# Patient Record
Sex: MALE | Race: BLACK OR AFRICAN AMERICAN | NOT HISPANIC OR LATINO | Employment: PART TIME | URBAN - METROPOLITAN AREA
[De-identification: names, ages, dates, MRNs, and addresses within clinical notes are randomized per-mention and may not be internally consistent; named-entity substitution may affect disease eponyms.]

---

## 2022-10-29 ENCOUNTER — HOSPITAL ENCOUNTER (EMERGENCY)
Facility: HOSPITAL | Age: 27
Discharge: HOME | End: 2022-10-29
Attending: EMERGENCY MEDICINE
Payer: MEDICAID

## 2022-10-29 VITALS
WEIGHT: 315 LBS | HEART RATE: 76 BPM | RESPIRATION RATE: 21 BRPM | DIASTOLIC BLOOD PRESSURE: 58 MMHG | SYSTOLIC BLOOD PRESSURE: 124 MMHG | TEMPERATURE: 97 F | OXYGEN SATURATION: 98 %

## 2022-10-29 DIAGNOSIS — R00.2 PALPITATIONS: ICD-10-CM

## 2022-10-29 DIAGNOSIS — R03.0 ELEVATED BLOOD PRESSURE READING: Primary | ICD-10-CM

## 2022-10-29 DIAGNOSIS — F41.9 ANXIOUS MOOD: ICD-10-CM

## 2022-10-29 LAB
ALBUMIN SERPL BCP-MCNC: 4.2 G/DL (ref 3.5–5.2)
ALP SERPL-CCNC: 88 U/L (ref 55–135)
ALT SERPL W/O P-5'-P-CCNC: 24 U/L (ref 10–44)
AMPHET+METHAMPHET UR QL: NEGATIVE
ANION GAP SERPL CALC-SCNC: 14 MMOL/L (ref 8–16)
AST SERPL-CCNC: 18 U/L (ref 10–40)
BARBITURATES UR QL SCN>200 NG/ML: NEGATIVE
BASOPHILS # BLD AUTO: 0.04 K/UL (ref 0–0.2)
BASOPHILS NFR BLD: 0.4 % (ref 0–1.9)
BENZODIAZ UR QL SCN>200 NG/ML: NEGATIVE
BILIRUB SERPL-MCNC: 0.4 MG/DL (ref 0.1–1)
BILIRUB UR QL STRIP: NEGATIVE
BNP SERPL-MCNC: <10 PG/ML (ref 0–99)
BUN SERPL-MCNC: 7 MG/DL (ref 6–20)
BZE UR QL SCN: NEGATIVE
CALCIUM SERPL-MCNC: 9.7 MG/DL (ref 8.7–10.5)
CANNABINOIDS UR QL SCN: NEGATIVE
CHLORIDE SERPL-SCNC: 103 MMOL/L (ref 95–110)
CLARITY UR: CLEAR
CO2 SERPL-SCNC: 21 MMOL/L (ref 23–29)
COLOR UR: YELLOW
CREAT SERPL-MCNC: 0.8 MG/DL (ref 0.5–1.4)
CREAT UR-MCNC: 110.3 MG/DL (ref 23–375)
D DIMER PPP IA.FEU-MCNC: 0.84 MG/L FEU
DIFFERENTIAL METHOD: ABNORMAL
EOSINOPHIL # BLD AUTO: 0.1 K/UL (ref 0–0.5)
EOSINOPHIL NFR BLD: 1.1 % (ref 0–8)
ERYTHROCYTE [DISTWIDTH] IN BLOOD BY AUTOMATED COUNT: 14.4 % (ref 11.5–14.5)
EST. GFR  (NO RACE VARIABLE): >60 ML/MIN/1.73 M^2
GLUCOSE SERPL-MCNC: 137 MG/DL (ref 70–110)
GLUCOSE UR QL STRIP: NEGATIVE
HCT VFR BLD AUTO: 50.7 % (ref 40–54)
HCV AB SERPL QL IA: NEGATIVE
HEP C VIRUS HOLD SPECIMEN: NORMAL
HGB BLD-MCNC: 17 G/DL (ref 14–18)
HGB UR QL STRIP: NEGATIVE
HIV 1+2 AB+HIV1 P24 AG SERPL QL IA: NEGATIVE
IMM GRANULOCYTES # BLD AUTO: 0.03 K/UL (ref 0–0.04)
IMM GRANULOCYTES NFR BLD AUTO: 0.3 % (ref 0–0.5)
KETONES UR QL STRIP: NEGATIVE
LEUKOCYTE ESTERASE UR QL STRIP: NEGATIVE
LYMPHOCYTES # BLD AUTO: 2.2 K/UL (ref 1–4.8)
LYMPHOCYTES NFR BLD: 21.9 % (ref 18–48)
MCH RBC QN AUTO: 26.5 PG (ref 27–31)
MCHC RBC AUTO-ENTMCNC: 33.5 G/DL (ref 32–36)
MCV RBC AUTO: 79 FL (ref 82–98)
METHADONE UR QL SCN>300 NG/ML: NEGATIVE
MONOCYTES # BLD AUTO: 0.5 K/UL (ref 0.3–1)
MONOCYTES NFR BLD: 5.2 % (ref 4–15)
NEUTROPHILS # BLD AUTO: 7.3 K/UL (ref 1.8–7.7)
NEUTROPHILS NFR BLD: 71.1 % (ref 38–73)
NITRITE UR QL STRIP: NEGATIVE
NRBC BLD-RTO: 0 /100 WBC
OPIATES UR QL SCN: NEGATIVE
PCP UR QL SCN>25 NG/ML: NEGATIVE
PH UR STRIP: 7 [PH] (ref 5–8)
PLATELET # BLD AUTO: 356 K/UL (ref 150–450)
PMV BLD AUTO: 10.2 FL (ref 9.2–12.9)
POTASSIUM SERPL-SCNC: 3.8 MMOL/L (ref 3.5–5.1)
PROT SERPL-MCNC: 8.2 G/DL (ref 6–8.4)
PROT UR QL STRIP: NEGATIVE
RBC # BLD AUTO: 6.42 M/UL (ref 4.6–6.2)
SODIUM SERPL-SCNC: 138 MMOL/L (ref 136–145)
SP GR UR STRIP: 1.01 (ref 1–1.03)
T4 FREE SERPL-MCNC: 0.99 NG/DL (ref 0.71–1.51)
TOXICOLOGY INFORMATION: NORMAL
TROPONIN I SERPL DL<=0.01 NG/ML-MCNC: <0.006 NG/ML (ref 0–0.03)
TSH SERPL DL<=0.005 MIU/L-ACNC: 1.3 UIU/ML (ref 0.4–4)
URN SPEC COLLECT METH UR: NORMAL
UROBILINOGEN UR STRIP-ACNC: NEGATIVE EU/DL
WBC # BLD AUTO: 10.22 K/UL (ref 3.9–12.7)

## 2022-10-29 PROCEDURE — 93005 ELECTROCARDIOGRAM TRACING: CPT

## 2022-10-29 PROCEDURE — 84443 ASSAY THYROID STIM HORMONE: CPT | Performed by: EMERGENCY MEDICINE

## 2022-10-29 PROCEDURE — 99285 EMERGENCY DEPT VISIT HI MDM: CPT | Mod: 25

## 2022-10-29 PROCEDURE — 84484 ASSAY OF TROPONIN QUANT: CPT | Performed by: NURSE PRACTITIONER

## 2022-10-29 PROCEDURE — 85025 COMPLETE CBC W/AUTO DIFF WBC: CPT | Performed by: NURSE PRACTITIONER

## 2022-10-29 PROCEDURE — 87389 HIV-1 AG W/HIV-1&-2 AB AG IA: CPT | Performed by: EMERGENCY MEDICINE

## 2022-10-29 PROCEDURE — 84439 ASSAY OF FREE THYROXINE: CPT | Performed by: EMERGENCY MEDICINE

## 2022-10-29 PROCEDURE — 93010 ELECTROCARDIOGRAM REPORT: CPT | Mod: ,,, | Performed by: INTERNAL MEDICINE

## 2022-10-29 PROCEDURE — 81003 URINALYSIS AUTO W/O SCOPE: CPT | Mod: 59 | Performed by: EMERGENCY MEDICINE

## 2022-10-29 PROCEDURE — 93010 EKG 12-LEAD: ICD-10-PCS | Mod: ,,, | Performed by: INTERNAL MEDICINE

## 2022-10-29 PROCEDURE — 25500020 PHARM REV CODE 255: Performed by: EMERGENCY MEDICINE

## 2022-10-29 PROCEDURE — 25000003 PHARM REV CODE 250: Performed by: EMERGENCY MEDICINE

## 2022-10-29 PROCEDURE — 83880 ASSAY OF NATRIURETIC PEPTIDE: CPT | Performed by: EMERGENCY MEDICINE

## 2022-10-29 PROCEDURE — 85379 FIBRIN DEGRADATION QUANT: CPT | Performed by: EMERGENCY MEDICINE

## 2022-10-29 PROCEDURE — 86803 HEPATITIS C AB TEST: CPT | Performed by: EMERGENCY MEDICINE

## 2022-10-29 PROCEDURE — 80053 COMPREHEN METABOLIC PANEL: CPT | Performed by: NURSE PRACTITIONER

## 2022-10-29 PROCEDURE — 80307 DRUG TEST PRSMV CHEM ANLYZR: CPT | Performed by: EMERGENCY MEDICINE

## 2022-10-29 RX ORDER — METOPROLOL TARTRATE 50 MG/1
50 TABLET ORAL
Status: COMPLETED | OUTPATIENT
Start: 2022-10-29 | End: 2022-10-29

## 2022-10-29 RX ORDER — HYDROXYZINE PAMOATE 25 MG/1
25 CAPSULE ORAL EVERY 6 HOURS PRN
Qty: 30 CAPSULE | Refills: 0 | Status: SHIPPED | OUTPATIENT
Start: 2022-10-29

## 2022-10-29 RX ADMIN — IOHEXOL 100 ML: 350 INJECTION, SOLUTION INTRAVENOUS at 09:10

## 2022-10-29 RX ADMIN — METOPROLOL TARTRATE 50 MG: 50 TABLET, FILM COATED ORAL at 07:10

## 2022-10-29 NOTE — FIRST PROVIDER EVALUATION
Medical screening examination initiated.  I have conducted a focused provider triage encounter, findings are as follows:    Brief history of present illness:  pt. C/o palpitations and exertional dyspnea    There were no vitals filed for this visit.    Pertinent physical exam:  nad    Brief workup plan:  cardiac    Preliminary workup initiated; this workup will be continued and followed by the physician or advanced practice provider that is assigned to the patient when roomed.

## 2022-10-30 NOTE — ED PROVIDER NOTES
SCRIBE #1 NOTE: INiesha am scribing for, and in the presence of, Polo Powers Jr., MD. I have scribed the HPI, ROS, and PEx.     SCRIBE #2 NOTE: I, Alexus Schroeder am scribing for, and in the presence of,  Sushant Lee MD. I have scribed the remaining portions of the note not scribed by Scribe #1.    History     Chief Complaint   Patient presents with    Tachycardia     Feeling palpitations since last night. HR on apple watch was 120. Has been feeling tired and short of breath after minimal effort      Review of patient's allergies indicates:  No Known Allergies      History of Present Illness     HPI    10/29/2022, 7:13 PM  History obtained from the patient      History of Present Illness: Emre Blanchard is a 27 y.o. male patient with a past medical history of HTN who presents to the Emergency Department for evaluation of tachycardia which onset last night. Pt notes no pain in his calves or hx of blood clots. Symptoms are constant and moderate in severity. No mitigating or exacerbating factors reported. Associated sxs include palpitations, fatigue, and paresthesia in left hand. Patient denies any CP, SOB, diaphoresis, LOC, fever, and all other sxs at this time. No further complaints or concerns at this time.     Arrival mode: Personal vehicle     PCP: No primary care provider on file.        Past Medical History:  History reviewed. No pertinent past medical history.    Past Surgical History:  History reviewed. No pertinent surgical history.      Family History:  History reviewed. No pertinent family history.    Social History:  Social History     Tobacco Use    Smoking status: Not on file    Smokeless tobacco: Not on file   Substance and Sexual Activity    Alcohol use: Not Currently    Drug use: Not Currently    Sexual activity: Yes     Partners: Female        Review of Systems     Review of Systems   Constitutional:  Positive for fatigue. Negative for diaphoresis and fever.   HENT:  Negative for sore  throat.    Respiratory:  Negative for shortness of breath.    Cardiovascular:  Positive for palpitations. Negative for chest pain.   Gastrointestinal:  Negative for nausea.   Genitourinary:  Negative for dysuria.   Musculoskeletal:  Negative for back pain.   Skin:  Negative for rash.   Neurological:  Negative for syncope and weakness.        (+) paresthesia left hand   Hematological:  Does not bruise/bleed easily.   All other systems reviewed and are negative.     Physical Exam     Initial Vitals   BP Pulse Resp Temp SpO2   10/29/22 1809 10/29/22 1809 10/29/22 1809 10/29/22 1810 10/29/22 1809   (!) 193/86 (!) 116 18 98.3 °F (36.8 °C) 98 %      MAP       --                 Physical Exam  Nursing Notes and Vital Signs Reviewed.  Constitutional: Patient is in no acute distress. Well-developed and well-nourished.  Head: Atraumatic. Normocephalic.  Eyes: PERRL. EOM intact. Conjunctivae are not pale. No scleral icterus.  ENT: Mucous membranes are moist. Oropharynx is clear and symmetric.    Neck: Supple. Full ROM. No lymphadenopathy.  Cardiovascular: Tachycardia. Regular rhythm. No murmurs, rubs, or gallops. Distal pulses are 2+ and symmetric.  Pulmonary/Chest: No respiratory distress. Clear to auscultation bilaterally. No wheezing or rales.  Abdominal: Soft and non-distended.  There is no tenderness.  No rebound, guarding, or rigidity. Good bowel sounds.  Genitourinary: No CVA tenderness  Musculoskeletal: Moves all extremities. No obvious deformities. No edema. No calf tenderness.  Skin: Warm and dry.  Neurological:  Alert, awake, and appropriate.  Normal speech.  No acute focal neurological deficits are appreciated.  Psychiatric: Normal affect. Good eye contact. Appropriate in content.     ED Course   Procedures  ED Vital Signs:  Vitals:    10/29/22 1809 10/29/22 1810 10/29/22 1900 10/29/22 1903   BP: (!) 193/86  (!) 143/86 (!) 143/86   Pulse: (!) 116  104 103   Resp: 18   13   Temp:  98.3 °F (36.8 °C)     TempSrc: Oral  Oral     SpO2: 98%   98%   Weight: (!) 155.6 kg (343 lb 0.6 oz)          Abnormal Lab Results:  Labs Reviewed   CBC W/ AUTO DIFFERENTIAL - Abnormal; Notable for the following components:       Result Value    RBC 6.42 (*)     MCV 79 (*)     MCH 26.5 (*)     All other components within normal limits   COMPREHENSIVE METABOLIC PANEL - Abnormal; Notable for the following components:    CO2 21 (*)     Glucose 137 (*)     All other components within normal limits   D DIMER, QUANTITATIVE - Abnormal; Notable for the following components:    D-Dimer 0.84 (*)     All other components within normal limits   TROPONIN I   TSH   T4, FREE   URINALYSIS, REFLEX TO URINE CULTURE    Narrative:     Specimen Source->Urine   DRUG SCREEN PANEL, URINE EMERGENCY    Narrative:     Specimen Source->Urine   HIV 1 / 2 ANTIBODY    Narrative:     Release to patient->Immediate   HEPATITIS C ANTIBODY    Narrative:     Release to patient->Immediate   HEP C VIRUS HOLD SPECIMEN    Narrative:     Release to patient->Immediate   B-TYPE NATRIURETIC PEPTIDE        All Lab Results:  Results for orders placed or performed during the hospital encounter of 10/29/22   CBC auto differential   Result Value Ref Range    WBC 10.22 3.90 - 12.70 K/uL    RBC 6.42 (H) 4.60 - 6.20 M/uL    Hemoglobin 17.0 14.0 - 18.0 g/dL    Hematocrit 50.7 40.0 - 54.0 %    MCV 79 (L) 82 - 98 fL    MCH 26.5 (L) 27.0 - 31.0 pg    MCHC 33.5 32.0 - 36.0 g/dL    RDW 14.4 11.5 - 14.5 %    Platelets 356 150 - 450 K/uL    MPV 10.2 9.2 - 12.9 fL    Immature Granulocytes 0.3 0.0 - 0.5 %    Gran # (ANC) 7.3 1.8 - 7.7 K/uL    Immature Grans (Abs) 0.03 0.00 - 0.04 K/uL    Lymph # 2.2 1.0 - 4.8 K/uL    Mono # 0.5 0.3 - 1.0 K/uL    Eos # 0.1 0.0 - 0.5 K/uL    Baso # 0.04 0.00 - 0.20 K/uL    nRBC 0 0 /100 WBC    Gran % 71.1 38.0 - 73.0 %    Lymph % 21.9 18.0 - 48.0 %    Mono % 5.2 4.0 - 15.0 %    Eosinophil % 1.1 0.0 - 8.0 %    Basophil % 0.4 0.0 - 1.9 %    Differential Method Automated     Comprehensive metabolic panel   Result Value Ref Range    Sodium 138 136 - 145 mmol/L    Potassium 3.8 3.5 - 5.1 mmol/L    Chloride 103 95 - 110 mmol/L    CO2 21 (L) 23 - 29 mmol/L    Glucose 137 (H) 70 - 110 mg/dL    BUN 7 6 - 20 mg/dL    Creatinine 0.8 0.5 - 1.4 mg/dL    Calcium 9.7 8.7 - 10.5 mg/dL    Total Protein 8.2 6.0 - 8.4 g/dL    Albumin 4.2 3.5 - 5.2 g/dL    Total Bilirubin 0.4 0.1 - 1.0 mg/dL    Alkaline Phosphatase 88 55 - 135 U/L    AST 18 10 - 40 U/L    ALT 24 10 - 44 U/L    Anion Gap 14 8 - 16 mmol/L    eGFR >60 >60 mL/min/1.73 m^2   Troponin I   Result Value Ref Range    Troponin I <0.006 0.000 - 0.026 ng/mL   TSH   Result Value Ref Range    TSH 1.297 0.400 - 4.000 uIU/mL   T4, free   Result Value Ref Range    Free T4 0.99 0.71 - 1.51 ng/dL   Urinalysis, Reflex to Urine Culture Urine, Clean Catch    Specimen: Urine   Result Value Ref Range    Specimen UA Urine, Clean Catch     Color, UA Yellow Yellow, Straw, Susana    Appearance, UA Clear Clear    pH, UA 7.0 5.0 - 8.0    Specific Gravity, UA 1.010 1.005 - 1.030    Protein, UA Negative Negative    Glucose, UA Negative Negative    Ketones, UA Negative Negative    Bilirubin (UA) Negative Negative    Occult Blood UA Negative Negative    Nitrite, UA Negative Negative    Urobilinogen, UA Negative <2.0 EU/dL    Leukocytes, UA Negative Negative   Drug screen panel, emergency   Result Value Ref Range    Benzodiazepines Negative Negative    Methadone metabolites Negative Negative    Cocaine (Metab.) Negative Negative    Opiate Scrn, Ur Negative Negative    Barbiturate Screen, Ur Negative Negative    Amphetamine Screen, Ur Negative Negative    THC Negative Negative    Phencyclidine Negative Negative    Creatinine, Urine 110.3 23.0 - 375.0 mg/dL    Toxicology Information SEE COMMENT    HIV 1/2 Ag/Ab (4th Gen)   Result Value Ref Range    HIV 1/2 Ag/Ab Negative Negative   Hepatitis C Antibody   Result Value Ref Range    Hepatitis C Ab Negative Negative   HCV  Virus Hold Specimen   Result Value Ref Range    HEP C Virus Hold Specimen Hold for HCV sendout    D dimer, quantitative   Result Value Ref Range    D-Dimer 0.84 (H) <0.50 mg/L FEU   Brain natriuretic peptide   Result Value Ref Range    BNP <10 0 - 99 pg/mL         Imaging Results:  Imaging Results              CTA Chest Non-Coronary (PE Studies) (Final result)  Result time 10/29/22 21:22:09      Final result by Mita Dhillon MD (10/29/22 21:22:09)                   Impression:      No pulmonary embolism.  No dissection.  No pneumonia.    All CT scans   are performed using dose optimization techniques including the following: automated exposure control; adjustment of the mA and/or kV; use of iterative reconstruction technique.  Dose modulation was employed for ALARA by means of: Automated exposure control; adjustment of the mA and/or kV according to patient size (this includes techniques or standardized protocols for targeted exams where dose is matched to indication/reason for exam; i.e. extremities or head); and/or use of iterative reconstructive technique.      Electronically signed by: Alejo Fan  Date:    10/29/2022  Time:    21:22               Narrative:    EXAMINATION:  CTA CHEST NON CORONARY (PE STUDIES)    CLINICAL HISTORY:  Pulmonary embolism (PE) suspected, positive D-dimer;    TECHNIQUE:  Low dose axial images, sagittal and coronal reformations were obtained from the thoracic inlet to the lung bases following the IV administration of 100 mL of Omnipaque 350.  Contrast timing was optimized to evaluate the pulmonary arteries.  MIP images were performed.    COMPARISON:  None    FINDINGS:  No evidence for pulmonary embolism.  No evidence for aortic dissection or aneurysm.  Cardiovascular structures have a normal non gated appearance.  Lungs are essentially clear.  No acute osseous injury.  Mild motion artifacts.                                       X-Ray Chest 1 View (Final result)  Result time 10/29/22  18:46:32      Final result by Mita Dhillon MD (10/29/22 18:46:32)                   Impression:      No acute abnormality.      Electronically signed by: Alejo Fan  Date:    10/29/2022  Time:    18:46               Narrative:    EXAMINATION:  XR CHEST 1 VIEW    CLINICAL HISTORY:  Palpitations    TECHNIQUE:  Single frontal view of the chest was performed.    COMPARISON:  None    FINDINGS:  The lungs are clear, with normal appearance of pulmonary vasculature and no pleural effusion or pneumothorax.    The cardiac silhouette is normal in size. The hilar and mediastinal contours are unremarkable.    Bones are intact.                                       The EKG was ordered, reviewed, and independently interpreted by the ED provider.  Interpretation time: 18:01  Rate: 110 BPM  Rhythm: sinus tachycardia  Interpretation: No acute ST changes. No STEMI.           The Emergency Provider reviewed the vital signs and test results, which are outlined above.     ED Discussion     8:00 PM: Dr. Powers transfers care of patient to Dr. Lee pending lab results.    10:27 PM: Reassessed pt at this time. Discussed with pt all pertinent ED information and results. Discussed pt dx and plan of tx. Gave pt all f/u and return to the ED instructions. All questions and concerns were addressed at this time. Pt expresses understanding of information and instructions, and is comfortable with plan to discharge. Pt is stable for discharge.    I discussed with patient and/or family/caretaker that evaluation in the ED does not suggest any emergent or life threatening medical conditions requiring immediate intervention beyond what was provided in the ED, and I believe patient is safe for discharge.  Regardless, an unremarkable evaluation in the ED does not preclude the development or presence of a serious of life threatening condition. As such, patient was instructed to return immediately for any worsening or change in current symptoms.          Medical Decision Making:   Clinical Tests:   Lab Tests: Ordered and Reviewed  Radiological Study: Ordered and Reviewed  Medical Tests: Ordered and Reviewed         ED Medication(s):  Medications   metoprolol tartrate (LOPRESSOR) tablet 50 mg (50 mg Oral Given 10/29/22 1900)   iohexoL (OMNIPAQUE 350) injection 100 mL (100 mLs Intravenous Given 10/29/22 2114)       New Prescriptions    HYDROXYZINE PAMOATE (VISTARIL) 25 MG CAP    Take 1 capsule (25 mg total) by mouth every 6 (six) hours as needed (anxiousness).        Medication List        START taking these medications      hydrOXYzine pamoate 25 MG Cap  Commonly known as: VISTARIL  Take 1 capsule (25 mg total) by mouth every 6 (six) hours as needed (anxiousness).               Where to Get Your Medications        You can get these medications from any pharmacy    Bring a paper prescription for each of these medications  hydrOXYzine pamoate 25 MG Cap            Follow-up Information       [facility]. Schedule an appointment as soon as possible for a visit in 2 days.    Why: For re-evaluation and further treatment  Contact information:  [address] [facility] [address] [phone]               [facility] - Emergency Dept.. Go today.    Specialty: Emergency Medicine  Why: If symptoms worsen, For re-evaluation and further treatment, As needed  Contact information:  [id number] [place] [address] [phone]                               Scribe Attestation:   Scribe #1: I performed the above scribed service and the documentation accurately describes the services I performed. I attest to the accuracy of the note.     Attending:   Physician Attestation Statement for Scribe #1: I, Polo Powers Jr., MD, personally performed the services described in this documentation, as scribed by Niesha Melo, in my presence, and it is both accurate and complete.       Scribe Attestation:   Scribe #2: I performed the above scribed  service and the documentation accurately describes the services I performed. I attest to the accuracy of the note.    Attending Attestation:           Physician Attestation for Scribe:    Physician Attestation Statement for Scribe #2: I, Sushant Lee MD, reviewed documentation, as scribed by Alexus Schroeder in my presence, and it is both accurate and complete. I also acknowledge and confirm the content of the note done by Dankibe #1.         Clinical Impression       ICD-10-CM ICD-9-CM   1. Elevated blood pressure reading  R03.0 796.2   2. Palpitations  R00.2 785.1   3. Anxious mood  F41.9 300.00       Disposition:   Disposition: Discharged  Condition: Stable       Sushant Lee MD  10/30/22 0658

## 2023-02-20 ENCOUNTER — OFFICE VISIT (OUTPATIENT)
Dept: PRIMARY CARE CLINIC | Facility: CLINIC | Age: 28
End: 2023-02-20
Payer: MEDICAID

## 2023-02-20 ENCOUNTER — LAB VISIT (OUTPATIENT)
Dept: LAB | Facility: HOSPITAL | Age: 28
End: 2023-02-20
Attending: FAMILY MEDICINE
Payer: MEDICAID

## 2023-02-20 ENCOUNTER — PATIENT OUTREACH (OUTPATIENT)
Dept: ADMINISTRATIVE | Facility: OTHER | Age: 28
End: 2023-02-20
Payer: MEDICAID

## 2023-02-20 DIAGNOSIS — E66.01 CLASS 3 SEVERE OBESITY WITH SERIOUS COMORBIDITY AND BODY MASS INDEX (BMI) OF 45.0 TO 49.9 IN ADULT, UNSPECIFIED OBESITY TYPE: ICD-10-CM

## 2023-02-20 DIAGNOSIS — F41.1 GAD (GENERALIZED ANXIETY DISORDER): ICD-10-CM

## 2023-02-20 DIAGNOSIS — R00.2 PALPITATIONS: ICD-10-CM

## 2023-02-20 DIAGNOSIS — Z11.3 SCREEN FOR STD (SEXUALLY TRANSMITTED DISEASE): ICD-10-CM

## 2023-02-20 DIAGNOSIS — I10 ESSENTIAL HYPERTENSION: Primary | ICD-10-CM

## 2023-02-20 DIAGNOSIS — I10 ESSENTIAL HYPERTENSION: ICD-10-CM

## 2023-02-20 DIAGNOSIS — G47.33 OSA (OBSTRUCTIVE SLEEP APNEA): ICD-10-CM

## 2023-02-20 PROBLEM — E66.813 CLASS 3 SEVERE OBESITY WITH SERIOUS COMORBIDITY AND BODY MASS INDEX (BMI) OF 45.0 TO 49.9 IN ADULT: Status: ACTIVE | Noted: 2023-02-20

## 2023-02-20 LAB
ALBUMIN SERPL BCP-MCNC: 4.4 G/DL (ref 3.5–5.2)
ALP SERPL-CCNC: 90 U/L (ref 55–135)
ALT SERPL W/O P-5'-P-CCNC: 30 U/L (ref 10–44)
ANION GAP SERPL CALC-SCNC: 15 MMOL/L (ref 8–16)
AST SERPL-CCNC: 20 U/L (ref 10–40)
BILIRUB SERPL-MCNC: 0.5 MG/DL (ref 0.1–1)
BUN SERPL-MCNC: 8 MG/DL (ref 6–20)
CALCIUM SERPL-MCNC: 10.3 MG/DL (ref 8.7–10.5)
CHLORIDE SERPL-SCNC: 103 MMOL/L (ref 95–110)
CHOLEST SERPL-MCNC: 235 MG/DL (ref 120–199)
CHOLEST/HDLC SERPL: 5.2 {RATIO} (ref 2–5)
CO2 SERPL-SCNC: 22 MMOL/L (ref 23–29)
CREAT SERPL-MCNC: 0.9 MG/DL (ref 0.5–1.4)
EST. GFR  (NO RACE VARIABLE): >60 ML/MIN/1.73 M^2
GLUCOSE SERPL-MCNC: 115 MG/DL (ref 70–110)
HDLC SERPL-MCNC: 45 MG/DL (ref 40–75)
HDLC SERPL: 19.1 % (ref 20–50)
LDLC SERPL CALC-MCNC: 156.4 MG/DL (ref 63–159)
NONHDLC SERPL-MCNC: 190 MG/DL
POTASSIUM SERPL-SCNC: 3.6 MMOL/L (ref 3.5–5.1)
PROT SERPL-MCNC: 8.4 G/DL (ref 6–8.4)
SODIUM SERPL-SCNC: 140 MMOL/L (ref 136–145)
TRIGL SERPL-MCNC: 168 MG/DL (ref 30–150)

## 2023-02-20 PROCEDURE — 80061 LIPID PANEL: CPT | Performed by: FAMILY MEDICINE

## 2023-02-20 PROCEDURE — 86592 SYPHILIS TEST NON-TREP QUAL: CPT | Performed by: FAMILY MEDICINE

## 2023-02-20 PROCEDURE — 99214 OFFICE O/P EST MOD 30 MIN: CPT | Mod: PBBFAC,PN | Performed by: FAMILY MEDICINE

## 2023-02-20 PROCEDURE — 4010F ACE/ARB THERAPY RXD/TAKEN: CPT | Mod: CPTII,,, | Performed by: FAMILY MEDICINE

## 2023-02-20 PROCEDURE — 99999 PR PBB SHADOW E&M-EST. PATIENT-LVL IV: CPT | Mod: PBBFAC,,, | Performed by: FAMILY MEDICINE

## 2023-02-20 PROCEDURE — 99204 OFFICE O/P NEW MOD 45 MIN: CPT | Mod: S$PBB,,, | Performed by: FAMILY MEDICINE

## 2023-02-20 PROCEDURE — 83036 HEMOGLOBIN GLYCOSYLATED A1C: CPT | Performed by: FAMILY MEDICINE

## 2023-02-20 PROCEDURE — 36415 COLL VENOUS BLD VENIPUNCTURE: CPT | Mod: PN | Performed by: FAMILY MEDICINE

## 2023-02-20 PROCEDURE — 4010F PR ACE/ARB THEARPY RXD/TAKEN: ICD-10-PCS | Mod: CPTII,,, | Performed by: FAMILY MEDICINE

## 2023-02-20 PROCEDURE — 99999 PR PBB SHADOW E&M-EST. PATIENT-LVL IV: ICD-10-PCS | Mod: PBBFAC,,, | Performed by: FAMILY MEDICINE

## 2023-02-20 PROCEDURE — 99204 PR OFFICE/OUTPT VISIT, NEW, LEVL IV, 45-59 MIN: ICD-10-PCS | Mod: S$PBB,,, | Performed by: FAMILY MEDICINE

## 2023-02-20 PROCEDURE — 1160F PR REVIEW ALL MEDS BY PRESCRIBER/CLIN PHARMACIST DOCUMENTED: ICD-10-PCS | Mod: CPTII,,, | Performed by: FAMILY MEDICINE

## 2023-02-20 PROCEDURE — 1160F RVW MEDS BY RX/DR IN RCRD: CPT | Mod: CPTII,,, | Performed by: FAMILY MEDICINE

## 2023-02-20 PROCEDURE — 80053 COMPREHEN METABOLIC PANEL: CPT | Performed by: FAMILY MEDICINE

## 2023-02-20 PROCEDURE — 1159F PR MEDICATION LIST DOCUMENTED IN MEDICAL RECORD: ICD-10-PCS | Mod: CPTII,,, | Performed by: FAMILY MEDICINE

## 2023-02-20 PROCEDURE — 1159F MED LIST DOCD IN RCRD: CPT | Mod: CPTII,,, | Performed by: FAMILY MEDICINE

## 2023-02-20 RX ORDER — AMLODIPINE AND BENAZEPRIL HYDROCHLORIDE 5; 20 MG/1; MG/1
1 CAPSULE ORAL DAILY
Qty: 90 CAPSULE | Refills: 3 | Status: SHIPPED | OUTPATIENT
Start: 2023-02-20 | End: 2024-02-20

## 2023-02-20 NOTE — PROGRESS NOTES
CHW - Initial Contact    This Community Health Worker completed the Social Determinant of Health questionnaire with patient during clinic visit today.    Pt identified barriers of most importance are. Patient shared no barriers at this time.   Referrals to community agencies completed with patient consent outside of [facility] include. No outside referrals at this time.  Referrals were put through [facility] - no  Support and Services: None  Other information discussed the patient needs help with. No other information was shared.   Follow up required: Yes  Follow-up Outreach - Due: 2/27/2023

## 2023-02-20 NOTE — PROGRESS NOTES
Subjective:       Patient ID: Emre Blanchard is a 27 y.o. male.    Chief Complaint: Establish Care and Anxiety        History of Present Illness:   Emre Blanchard 27 y.o. male presents today with   Here to establish care  Blood pressure was found to be elevated today. Has no history of HTN but looking back at epic, he has had elevated blood pressure every ER visit since .   Denies chest pain, shortness of breath, dyspnea on exertion, left arm or neck pain, nausea, vomiting, diaphoresis, PND and orthopnea.  Nothing makes this worse or better.    Advocates occasional palpitations and anxiety.  Obesity pos. He has always been big and the family has weight problem  Some exercise but will improve  Diet could be better; was advised on dash diet  Non smoker and none alcohol  -works form home.    I have reviewed labs from the latest ER visit and his UDS, Thyroid and cbc were normal, MCV was low.     Denies personal and family history of  colon cancer, breast cancer.    Maternal GF  in his 40s with heart disease.    Patient complains of anxiety disorder. He has the following symptoms-fatigue, occasional palpitations, racing thoughts-at time. Onset of symptoms was all his life, worsening since that time but he has never sought help. He denies current suicidal and homicidal ideation. Family history is probably for anxiety and depression. Possible organic causes contributing to symptoms are:none. Denies drugs and alcohol but occassionally weed at parties. Drug screening was negative from the ER visit. Previous treatment includes Hydroxyzine per family NP and it helped prn.      STOP - BANG Questionnaire:      1. Snoring : Do you snore loudly ?  yes     2. Tired : Do you often feel tired, fatigued, or sleepy during daytime? yes     3. Observed: Has anyone observed you stop breathing during your sleep? yes     4. Blood pressure : Do you have or are you being treated for high blood pressure? yes     5. BMI :BMI  more than 35 kg/m2? yes      6. Age : Age over 50 yr old? no     7. Neck circumference:   For male, is your shirt collar 17 inches / 43cm or larger? yes  For female, is your shirt collar 16 inches / 41cm or larger?      8. Gender: Gender male? yes     STOP BANG SCORE 7     High risk of GIRISH: Yes 5 - 8  Intermediate risk of GIRISH: Yes 3 - 4  Low risk of GIRISH: Yes 0 - 2    References:   STOP Questionnaire     A Tool to Screen Patients for Obstructive Sleep Apnea: RUBI Marcus.P.C., Irving Zuniga M.B.B.S., Sonal Lemon M.D.,Katherin Martel, Ph.D., Martin Jauregui M.B.B.S.,_ Livan Young.,_ Holly Palafox M.D., TERESE Lee.R.C.P.C.; Anesthesiology 2008; 108:812-21 Copyright © 2008, the American Society of Anesthesiologists, Inc. [employer]        History reviewed. No pertinent past medical history.  No family history on file.  Social History     Socioeconomic History    Marital status: Single   Tobacco Use    Smoking status: Never     Passive exposure: Never    Smokeless tobacco: Never   Substance and Sexual Activity    Alcohol use: Yes     Comment: occassion    Drug use: Not Currently    Sexual activity: Not Currently     Partners: Female     Social Determinants of Health     Financial Resource Strain: Low Risk     Difficulty of Paying Living Expenses: Not hard at all   Food Insecurity: No Food Insecurity    Worried About Running Out of Food in the Last Year: Never true    Ran Out of Food in the Last Year: Never true   Transportation Needs: No Transportation Needs    Lack of Transportation (Medical): No    Lack of Transportation (Non-Medical): No   Physical Activity: Inactive    Days of Exercise per Week: 0 days    Minutes of Exercise per Session: 0 min   Stress: No Stress Concern Present    Feeling of Stress : Only a little   Social Connections: Socially Isolated    Frequency of Communication with Friends and Family: More than three times a week    Frequency of  "Social Gatherings with Friends and Family: More than three times a week    Attends Latter day Services: Never    Active Member of Clubs or Organizations: No    Attends Club or Organization Meetings: Never    Marital Status: Never    Housing Stability: Low Risk     Unable to Pay for Housing in the Last Year: No    Number of Places Lived in the Last Year: 1    Unstable Housing in the Last Year: No     Outpatient Encounter Medications as of 2/20/2023   Medication Sig Dispense Refill    hydrOXYzine pamoate (VISTARIL) 25 MG Cap Take 1 capsule (25 mg total) by mouth every 6 (six) hours as needed (anxiousness). 30 capsule 0    amlodipine-benazepril 5-20 mg (LOTREL) 5-20 mg per capsule Take 1 capsule by mouth once daily. 90 capsule 3     No facility-administered encounter medications on file as of 2/20/2023.       Review of Systems    Review of Systems      A complete 10 point ROS was completed and are positive as per above HPI.    Otherwise negative for fever, diplopia, chest pain, shortness of breath, vomiting, blood in urine, joint pain, skin rash, seizures and unusual bleeding.     Objective:      BP (!) (P) 170/99 (BP Location: Left arm, Patient Position: Sitting)   Pulse (P) 109   Temp (P) 98.4 °F (36.9 °C) (Temporal)   Ht (P) 5' 11" (1.803 m)   Wt (!) (P) 147.4 kg (325 lb 1.1 oz)   SpO2 (P) 98%   BMI (P) 45.34 kg/m²   Physical Exam  Cardiovascular:      Rate and Rhythm: Tachycardia present.      Heart sounds: No murmur heard.      CONSTITUTIONAL: No apparent distress. Appears comfortable. Does not appear acutely ill or septic.  CARDIOVASCULAR: No perioral cyanosis  PULMONARY: Breathing unlabored. No retractions Chest expansion grossly normal.  PSYCHIATRIC: Alert and conversant and grossly oriented. Mood is grossly neutral. Affect appropriate. Judgment and insight grossly intact.  NEUROLOGIC: No focal sensory deficits reported.   Results for orders placed or performed during the hospital encounter of " 10/29/22   CBC auto differential   Result Value Ref Range    WBC 10.22 3.90 - 12.70 K/uL    RBC 6.42 (H) 4.60 - 6.20 M/uL    Hemoglobin 17.0 14.0 - 18.0 g/dL    Hematocrit 50.7 40.0 - 54.0 %    MCV 79 (L) 82 - 98 fL    MCH 26.5 (L) 27.0 - 31.0 pg    MCHC 33.5 32.0 - 36.0 g/dL    RDW 14.4 11.5 - 14.5 %    Platelets 356 150 - 450 K/uL    MPV 10.2 9.2 - 12.9 fL    Immature Granulocytes 0.3 0.0 - 0.5 %    Gran # (ANC) 7.3 1.8 - 7.7 K/uL    Immature Grans (Abs) 0.03 0.00 - 0.04 K/uL    Lymph # 2.2 1.0 - 4.8 K/uL    Mono # 0.5 0.3 - 1.0 K/uL    Eos # 0.1 0.0 - 0.5 K/uL    Baso # 0.04 0.00 - 0.20 K/uL    nRBC 0 0 /100 WBC    Gran % 71.1 38.0 - 73.0 %    Lymph % 21.9 18.0 - 48.0 %    Mono % 5.2 4.0 - 15.0 %    Eosinophil % 1.1 0.0 - 8.0 %    Basophil % 0.4 0.0 - 1.9 %    Differential Method Automated    Comprehensive metabolic panel   Result Value Ref Range    Sodium 138 136 - 145 mmol/L    Potassium 3.8 3.5 - 5.1 mmol/L    Chloride 103 95 - 110 mmol/L    CO2 21 (L) 23 - 29 mmol/L    Glucose 137 (H) 70 - 110 mg/dL    BUN 7 6 - 20 mg/dL    Creatinine 0.8 0.5 - 1.4 mg/dL    Calcium 9.7 8.7 - 10.5 mg/dL    Total Protein 8.2 6.0 - 8.4 g/dL    Albumin 4.2 3.5 - 5.2 g/dL    Total Bilirubin 0.4 0.1 - 1.0 mg/dL    Alkaline Phosphatase 88 55 - 135 U/L    AST 18 10 - 40 U/L    ALT 24 10 - 44 U/L    Anion Gap 14 8 - 16 mmol/L    eGFR >60 >60 mL/min/1.73 m^2   Troponin I   Result Value Ref Range    Troponin I <0.006 0.000 - 0.026 ng/mL   TSH   Result Value Ref Range    TSH 1.297 0.400 - 4.000 uIU/mL   T4, free   Result Value Ref Range    Free T4 0.99 0.71 - 1.51 ng/dL   Urinalysis, Reflex to Urine Culture Urine, Clean Catch    Specimen: Urine   Result Value Ref Range    Specimen UA Urine, Clean Catch     Color, UA Yellow Yellow, Straw, Susana    Appearance, UA Clear Clear    pH, UA 7.0 5.0 - 8.0    Specific Gravity, UA 1.010 1.005 - 1.030    Protein, UA Negative Negative    Glucose, UA Negative Negative    Ketones, UA Negative Negative     Bilirubin (UA) Negative Negative    Occult Blood UA Negative Negative    Nitrite, UA Negative Negative    Urobilinogen, UA Negative <2.0 EU/dL    Leukocytes, UA Negative Negative   Drug screen panel, emergency   Result Value Ref Range    Benzodiazepines Negative Negative    Methadone metabolites Negative Negative    Cocaine (Metab.) Negative Negative    Opiate Scrn, Ur Negative Negative    Barbiturate Screen, Ur Negative Negative    Amphetamine Screen, Ur Negative Negative    THC Negative Negative    Phencyclidine Negative Negative    Creatinine, Urine 110.3 23.0 - 375.0 mg/dL    Toxicology Information SEE COMMENT    HIV 1/2 Ag/Ab (4th Gen)   Result Value Ref Range    HIV 1/2 Ag/Ab Negative Negative   Hepatitis C Antibody   Result Value Ref Range    Hepatitis C Ab Negative Negative   HCV Virus Hold Specimen   Result Value Ref Range    HEP C Virus Hold Specimen Hold for HCV sendout    D dimer, quantitative   Result Value Ref Range    D-Dimer 0.84 (H) <0.50 mg/L FEU   Brain natriuretic peptide   Result Value Ref Range    BNP <10 0 - 99 pg/mL     Assessment:       1. Essential hypertension    2. Screen for STD (sexually transmitted disease)    3. Class 3 severe obesity with serious comorbidity and body mass index (BMI) of 45.0 to 49.9 in adult, unspecified obesity type    4. BMI 45.0-49.9, adult    5. GIRISH (obstructive sleep apnea)    6. LORNE (generalized anxiety disorder)    7. Palpitations        Plan:   Essential hypertension  -     Lipid Panel; Future; Expected date: 02/20/2023  -     amlodipine-benazepril 5-20 mg (LOTREL) 5-20 mg per capsule; Take 1 capsule by mouth once daily.  Dispense: 90 capsule; Refill: 3    Screen for STD (sexually transmitted disease)  -     C. trachomatis/N. gonorrhoeae by AMP DNA; Future; Expected date: 02/20/2023  -     RPR; Future; Expected date: 02/20/2023    Class 3 severe obesity with serious comorbidity and body mass index (BMI) of 45.0 to 49.9 in adult, unspecified obesity type  -      Comprehensive Metabolic Panel; Future; Expected date: 02/20/2023  -     Hemoglobin A1C; Future; Expected date: 02/20/2023    BMI 45.0-49.9, adult  -     Hemoglobin A1C; Future; Expected date: 02/20/2023    GIRISH (obstructive sleep apnea)  -     Home Sleep Study; Future    LOREN (generalized anxiety disorder)  -     Ambulatory referral/consult to Psychiatry; Future; Expected date: 02/27/2023    Palpitations  -     Ambulatory referral/consult to Cardiology; Future; Expected date: 02/27/2023    I have reviewed all of the patient's clinical history available in care everywhere and Epic and have utilized this in my evaluation and management recommendations today.     Weight loss is a comprehensive lifestyle intervention: a combination of diet, exercise, and behavioral modification. Set realistic goals for weight loss. The behavioral modification component facilitates adherence to diet and exercise regimens, and includes regular self-monitoring of food intake, physical activity, and weight.     Choose a diet rich in fresh fruits, fibers, vegetables, and low-fat dairy products, but low in meats, sweets, and refined grains   Be more active. If you are able, walk for 35 mins 5 times a week.         Lissa Lomeli MD

## 2023-02-21 ENCOUNTER — TELEPHONE (OUTPATIENT)
Dept: SLEEP MEDICINE | Facility: CLINIC | Age: 28
End: 2023-02-21
Payer: MEDICAID

## 2023-02-21 LAB
ESTIMATED AVG GLUCOSE: 123 MG/DL (ref 68–131)
HBA1C MFR BLD: 5.9 % (ref 4–5.6)

## 2023-02-21 NOTE — TELEPHONE ENCOUNTER
----- Message from Chantel Gaona sent at 2/20/2023  7:05 PM CST -----  [facility]

## 2023-02-22 ENCOUNTER — TELEPHONE (OUTPATIENT)
Dept: PRIMARY CARE CLINIC | Facility: CLINIC | Age: 28
End: 2023-02-22
Payer: MEDICAID

## 2023-02-22 ENCOUNTER — PATIENT MESSAGE (OUTPATIENT)
Dept: ADMINISTRATIVE | Facility: OTHER | Age: 28
End: 2023-02-22
Payer: MEDICAID

## 2023-02-22 DIAGNOSIS — R73.03 PREDIABETES: Primary | ICD-10-CM

## 2023-02-22 DIAGNOSIS — E78.2 MIXED HYPERLIPIDEMIA: ICD-10-CM

## 2023-02-22 DIAGNOSIS — I10 ESSENTIAL HYPERTENSION: ICD-10-CM

## 2023-02-22 LAB — RPR SER QL: NORMAL

## 2023-02-22 NOTE — PROGRESS NOTES
I have reviewed all your lab results.   Blood count, kidney function, liver function, electrolytes are all normal.    But you have PREDIABETES, HYPerlipidemia.  I recommend intense lifestyle modification for weight reduction and increased physical activity to prevent progression of these conditions.    Repeat test in 6 mons

## 2023-02-22 NOTE — TELEPHONE ENCOUNTER
----- Message from Lissa Lomeli MD sent at 2/22/2023  2:47 PM CST -----  I have reviewed all your lab results.   Blood count, kidney function, liver function, electrolytes are all normal.    But you have PREDIABETES, HYPerlipidemia.  I recommend intense lifestyle modification for weight reduction and increased physical activity to prevent progression of these conditions.    Repeat test in 6 mons

## 2023-02-27 ENCOUNTER — TELEPHONE (OUTPATIENT)
Dept: PRIMARY CARE CLINIC | Facility: CLINIC | Age: 28
End: 2023-02-27
Payer: MEDICAID

## 2023-02-27 ENCOUNTER — OFFICE VISIT (OUTPATIENT)
Dept: CARDIOLOGY | Facility: CLINIC | Age: 28
End: 2023-02-27
Payer: MEDICAID

## 2023-02-27 VITALS
WEIGHT: 315 LBS | DIASTOLIC BLOOD PRESSURE: 84 MMHG | HEIGHT: 71 IN | SYSTOLIC BLOOD PRESSURE: 142 MMHG | HEART RATE: 109 BPM | OXYGEN SATURATION: 99 % | BODY MASS INDEX: 44.1 KG/M2

## 2023-02-27 DIAGNOSIS — Z91.89 AT RISK FOR OBSTRUCTIVE SLEEP APNEA: ICD-10-CM

## 2023-02-27 DIAGNOSIS — F41.1 GAD (GENERALIZED ANXIETY DISORDER): ICD-10-CM

## 2023-02-27 DIAGNOSIS — R00.2 PALPITATIONS: Primary | ICD-10-CM

## 2023-02-27 DIAGNOSIS — E66.01 CLASS 3 SEVERE OBESITY WITH SERIOUS COMORBIDITY AND BODY MASS INDEX (BMI) OF 45.0 TO 49.9 IN ADULT, UNSPECIFIED OBESITY TYPE: ICD-10-CM

## 2023-02-27 DIAGNOSIS — I10 ESSENTIAL HYPERTENSION: ICD-10-CM

## 2023-02-27 PROCEDURE — 3079F PR MOST RECENT DIASTOLIC BLOOD PRESSURE 80-89 MM HG: ICD-10-PCS | Mod: CPTII,,, | Performed by: INTERNAL MEDICINE

## 2023-02-27 PROCEDURE — 99204 PR OFFICE/OUTPT VISIT, NEW, LEVL IV, 45-59 MIN: ICD-10-PCS | Mod: S$PBB,,, | Performed by: INTERNAL MEDICINE

## 2023-02-27 PROCEDURE — 4010F ACE/ARB THERAPY RXD/TAKEN: CPT | Mod: CPTII,,, | Performed by: INTERNAL MEDICINE

## 2023-02-27 PROCEDURE — 3008F PR BODY MASS INDEX (BMI) DOCUMENTED: ICD-10-PCS | Mod: CPTII,,, | Performed by: INTERNAL MEDICINE

## 2023-02-27 PROCEDURE — 3044F HG A1C LEVEL LT 7.0%: CPT | Mod: CPTII,,, | Performed by: INTERNAL MEDICINE

## 2023-02-27 PROCEDURE — 4010F PR ACE/ARB THEARPY RXD/TAKEN: ICD-10-PCS | Mod: CPTII,,, | Performed by: INTERNAL MEDICINE

## 2023-02-27 PROCEDURE — 3044F PR MOST RECENT HEMOGLOBIN A1C LEVEL <7.0%: ICD-10-PCS | Mod: CPTII,,, | Performed by: INTERNAL MEDICINE

## 2023-02-27 PROCEDURE — 99999 PR PBB SHADOW E&M-EST. PATIENT-LVL III: ICD-10-PCS | Mod: PBBFAC,,, | Performed by: INTERNAL MEDICINE

## 2023-02-27 PROCEDURE — 3077F PR MOST RECENT SYSTOLIC BLOOD PRESSURE >= 140 MM HG: ICD-10-PCS | Mod: CPTII,,, | Performed by: INTERNAL MEDICINE

## 2023-02-27 PROCEDURE — 99213 OFFICE O/P EST LOW 20 MIN: CPT | Mod: PBBFAC | Performed by: INTERNAL MEDICINE

## 2023-02-27 PROCEDURE — 3077F SYST BP >= 140 MM HG: CPT | Mod: CPTII,,, | Performed by: INTERNAL MEDICINE

## 2023-02-27 PROCEDURE — 99999 PR PBB SHADOW E&M-EST. PATIENT-LVL III: CPT | Mod: PBBFAC,,, | Performed by: INTERNAL MEDICINE

## 2023-02-27 PROCEDURE — 1159F MED LIST DOCD IN RCRD: CPT | Mod: CPTII,,, | Performed by: INTERNAL MEDICINE

## 2023-02-27 PROCEDURE — 3079F DIAST BP 80-89 MM HG: CPT | Mod: CPTII,,, | Performed by: INTERNAL MEDICINE

## 2023-02-27 PROCEDURE — 99204 OFFICE O/P NEW MOD 45 MIN: CPT | Mod: S$PBB,,, | Performed by: INTERNAL MEDICINE

## 2023-02-27 PROCEDURE — 1159F PR MEDICATION LIST DOCUMENTED IN MEDICAL RECORD: ICD-10-PCS | Mod: CPTII,,, | Performed by: INTERNAL MEDICINE

## 2023-02-27 PROCEDURE — 3008F BODY MASS INDEX DOCD: CPT | Mod: CPTII,,, | Performed by: INTERNAL MEDICINE

## 2023-02-27 NOTE — TELEPHONE ENCOUNTER
I returned the patient call spoke to his Mother-  on patient's behalf patient information verified she was informed of the patient's follow up appointment and they was also informed to keep the appointment for a new  pulmonary referral. Verbalized understanding.

## 2023-02-27 NOTE — TELEPHONE ENCOUNTER
Pt mother called regarding sleep apnea machine, pt mother didn't answer unable to leave message.  ---- Message from Sylwia Blanchard sent at 2/27/2023  9:24 AM CST -----  Contact: Shemar/Mother  Pt mother is calling in regards to when and were do the pt  his sleep apnea machine.Please call back at [phone]          Thanks  JAC

## 2023-02-27 NOTE — PROGRESS NOTES
Subjective:   Patient ID:  Emre Blanchard is a 27 y.o. male who presents for evaluation of Palpitations      HPI  27-year-old male, morbid obesity, prediabetes, hypertension started a week ago on blood pressure medications.    Comes in for evaluation of palpitations.    He states they occur once a day.  He feels palpitations for less than a minute.  Denies any excessive coffee tea Coke or injury drinks.    He does have features of sleep apnea and he is awaiting sleep study ordered by PCP.    He is compliant with medications.    He states that he started recently watching his diet and his salt intake.    Denies any chest pain with or without exertion.  He points to some left arm pain not related to exertion.    His lipid panel shows an elevated LDL of 157.        History reviewed. No pertinent past medical history.    History reviewed. No pertinent surgical history.    Social History     Tobacco Use    Smoking status: Never     Passive exposure: Never    Smokeless tobacco: Never   Substance Use Topics    Alcohol use: Yes     Comment: occassion    Drug use: Not Currently       History reviewed. No pertinent family history.    Review of Systems   Constitutional: Negative for fever and malaise/fatigue.   HENT:  Negative for sore throat.    Eyes:  Negative for blurred vision.   Cardiovascular:  Positive for palpitations. Negative for chest pain, claudication, cyanosis, dyspnea on exertion, irregular heartbeat, leg swelling, near-syncope, orthopnea, paroxysmal nocturnal dyspnea and syncope.   Respiratory:  Positive for snoring. Negative for cough and hemoptysis.    Hematologic/Lymphatic: Negative for bleeding problem.   Skin:  Negative for rash.   Musculoskeletal:  Negative for falls.   Gastrointestinal:  Negative for abdominal pain.   Genitourinary: Negative.    Neurological: Negative.    Psychiatric/Behavioral:  Negative for altered mental status and substance abuse.      Current Outpatient Medications on File Prior to  Visit   Medication Sig    amlodipine-benazepril 5-20 mg (LOTREL) 5-20 mg per capsule Take 1 capsule by mouth once daily.    hydrOXYzine pamoate (VISTARIL) 25 MG Cap Take 1 capsule (25 mg total) by mouth every 6 (six) hours as needed (anxiousness).     No current facility-administered medications on file prior to visit.       Objective:   Objective:  Wt Readings from Last 3 Encounters:   02/27/23 (!) 148.8 kg (328 lb 0.7 oz)   02/20/23 (!) (P) 147.4 kg (325 lb 1.1 oz)   10/29/22 (!) 155.6 kg (343 lb 0.6 oz)     Temp Readings from Last 3 Encounters:   02/20/23 (P) 98.4 °F (36.9 °C) ((P) Temporal)   10/29/22 97.3 °F (36.3 °C) (Oral)     BP Readings from Last 3 Encounters:   02/27/23 (!) 142/84   02/20/23 (!) (P) 170/99   10/29/22 (!) 124/58     Pulse Readings from Last 3 Encounters:   02/27/23 109   02/20/23 (P) 109   10/29/22 76       Physical Exam  Vitals reviewed.   Constitutional:       Appearance: He is well-developed.   HENT:      Head: Normocephalic and atraumatic.   Eyes:      General: No scleral icterus.     Conjunctiva/sclera: Conjunctivae normal.   Cardiovascular:      Rate and Rhythm: Normal rate and regular rhythm.      Pulses: Intact distal pulses.      Heart sounds: Normal heart sounds. No murmur heard.  Pulmonary:      Effort: No respiratory distress.      Breath sounds: No wheezing or rales.   Chest:      Chest wall: No tenderness.   Abdominal:      General: Bowel sounds are normal. There is no distension.      Palpations: Abdomen is soft.      Tenderness: There is no guarding.   Musculoskeletal:         General: Normal range of motion.      Cervical back: Normal range of motion and neck supple.   Skin:     General: Skin is warm.   Neurological:      Mental Status: He is alert and oriented to person, place, and time.       Lab Results   Component Value Date    CHOL 235 (H) 02/20/2023     Lab Results   Component Value Date    HDL 45 02/20/2023     Lab Results   Component Value Date    LDLCALC 156.4  02/20/2023     Lab Results   Component Value Date    TRIG 168 (H) 02/20/2023     Lab Results   Component Value Date    CHOLHDL 19.1 (L) 02/20/2023       Chemistry        Component Value Date/Time     02/20/2023 1134    K 3.6 02/20/2023 1134     02/20/2023 1134    CO2 22 (L) 02/20/2023 1134    BUN 8 02/20/2023 1134    CREATININE 0.9 02/20/2023 1134     (H) 02/20/2023 1134        Component Value Date/Time    CALCIUM 10.3 02/20/2023 1134    ALKPHOS 90 02/20/2023 1134    AST 20 02/20/2023 1134    ALT 30 02/20/2023 1134    BILITOT 0.5 02/20/2023 1134          Lab Results   Component Value Date    TSH 1.297 10/29/2022     No results found for: INR, PROTIME  Lab Results   Component Value Date    WBC 10.22 10/29/2022    HGB 17.0 10/29/2022    HCT 50.7 10/29/2022    MCV 79 (L) 10/29/2022     10/29/2022     BNP  @LABRCNTIP(BNP,BNPTRIAGEBLO)@  Estimated Creatinine Clearance: 181 mL/min (based on SCr of 0.9 mg/dL).     Imaging:  ======  No results found for this or any previous visit.    No results found for this or any previous visit.    No results found for this or any previous visit.    No results found for this or any previous visit.    No results found for this or any previous visit.    No valid procedures specified.    Diagnostic Results:  ECG: Reviewed    The ASCVD Risk score (Deborah DK, et al., 2019) failed to calculate for the following reasons:    The 2019 ASCVD risk score is only valid for ages 40 to 79    Assessment and Plan:   Palpitations  -     Holter monitor - 48 hour; Future    Essential hypertension    BMI 45.0-49.9, adult    Class 3 severe obesity with serious comorbidity and body mass index (BMI) of 45.0 to 49.9 in adult, unspecified obesity type    LOREN (generalized anxiety disorder)    At risk for obstructive sleep apnea      Reviewed all tests and above medical conditions with patient in detail and formulated treatment plan.  Risk factor modification discussed.   Cardiac low salt  diet discussed.  Maintaining healthy weight and weight loss goals were discussed in clinic.  Stressed importance of proceeding ahead with sleep study.    Lifestyle modification for his hyperlipidemia.  LDL is less than 160 on 1 check.  No statin at this point.  Will continue to monitor.    Addressed his weight.  Continue to exercise.  Lose weight.  Prediabetes again stressed importance of weight loss and management of his weight.    Monitor blood pressure more often at home.  And decide on further need or not to add antihypertensives.    TSH normal.      Follow up in six-month

## 2023-02-28 ENCOUNTER — PATIENT OUTREACH (OUTPATIENT)
Dept: ADMINISTRATIVE | Facility: OTHER | Age: 28
End: 2023-02-28
Payer: MEDICAID

## 2023-02-28 NOTE — PROGRESS NOTES
CHW - Follow Up    This Community Health Worker completed a follow up visit with patient via telephone today.  Pt reported. No new information.  Community Health Worker provided patient with a follow up call to make sure patient is doing well.  Follow up required: No  No future outreach task assigned      CHW - Case Closure    This Community Health Worker spoke to patient via telephone today.   Pt reported. No new information.  Pt denied any additional needs at this time and agrees with episode closure at this time.  Provided patient with Community Health Worker's contact information and encouraged her to contact this Community Health Worker if additional needs arise.

## 2023-03-03 ENCOUNTER — TELEPHONE (OUTPATIENT)
Dept: PRIMARY CARE CLINIC | Facility: CLINIC | Age: 28
End: 2023-03-03
Payer: MEDICAID

## 2023-03-06 ENCOUNTER — CLINICAL SUPPORT (OUTPATIENT)
Dept: PRIMARY CARE CLINIC | Facility: CLINIC | Age: 28
End: 2023-03-06
Payer: MEDICAID

## 2023-03-06 VITALS
OXYGEN SATURATION: 99 % | BODY MASS INDEX: 44.1 KG/M2 | WEIGHT: 315 LBS | RESPIRATION RATE: 20 BRPM | TEMPERATURE: 99 F | HEIGHT: 71 IN | DIASTOLIC BLOOD PRESSURE: 80 MMHG | SYSTOLIC BLOOD PRESSURE: 138 MMHG

## 2023-03-06 DIAGNOSIS — I10 ESSENTIAL HYPERTENSION: Primary | ICD-10-CM

## 2023-03-06 PROCEDURE — 99213 OFFICE O/P EST LOW 20 MIN: CPT | Mod: PBBFAC,PN

## 2023-03-06 PROCEDURE — 99999 PR PBB SHADOW E&M-EST. PATIENT-LVL III: CPT | Mod: PBBFAC,,,

## 2023-03-06 PROCEDURE — 99999 PR PBB SHADOW E&M-EST. PATIENT-LVL III: ICD-10-PCS | Mod: PBBFAC,,,

## 2023-03-06 NOTE — PROGRESS NOTES
Patient scheduled for bp check patient educated on medication and using the dig med sadaf he is successfully set up and obtaining reading

## 2023-03-08 ENCOUNTER — TELEPHONE (OUTPATIENT)
Dept: PRIMARY CARE CLINIC | Facility: CLINIC | Age: 28
End: 2023-03-08
Payer: MEDICAID

## 2023-03-10 ENCOUNTER — HOSPITAL ENCOUNTER (OUTPATIENT)
Dept: SLEEP MEDICINE | Facility: HOSPITAL | Age: 28
Discharge: HOME | End: 2023-03-10
Attending: FAMILY MEDICINE
Payer: MEDICAID

## 2023-03-10 DIAGNOSIS — G47.33 OSA (OBSTRUCTIVE SLEEP APNEA): ICD-10-CM

## 2023-03-13 PROCEDURE — 95800 PR SLEEP STUDY, UNATTENDED, RECORD HEART RATE/O2 SAT/RESP ANAL/SLEEP TIME: ICD-10-PCS | Mod: 26,,, | Performed by: INTERNAL MEDICINE

## 2023-03-13 PROCEDURE — 95800 SLP STDY UNATTENDED: CPT | Mod: 26,,, | Performed by: INTERNAL MEDICINE

## 2023-03-15 ENCOUNTER — TELEPHONE (OUTPATIENT)
Dept: CARDIOLOGY | Facility: HOSPITAL | Age: 28
End: 2023-03-15
Payer: MEDICAID

## 2023-03-15 NOTE — PROCEDURES
PHYSICIAN INTERPRETATION AND COMMENTS: Findings are consistent with mild obstructive sleep apnea (GIRISH). Please  refer to sleep disorder clinic . Study needs to be repeated to validate current borderline GIRISH diagnosis  CLINICAL HISTORY: 27 year old male presented with: 17.5 inch neck, BMI of 44.6, an Morganza sleepiness score of 7, history  of hypertension, a previous diagnosis of GIRISH and symptoms of nocturnal snoring and waking up choking. Based on the  clinical history, the patient has a high pre-test probability of having Severe GIRISH.  SLEEP STUDY FINDINGS: Patient underwent a 1 night Home Sleep Test and by behavioral criteria, slept for approximately  6.17 hours, with a sleep latency of 6 minutes and a sleep efficiency of 90%. When considering more subtle measures of  sleep disordered breathing, the overall respiratory disturbance index is mild(RDI=15) based on a 1% hypopnea desaturation  criteria with confirmation by surrogate arousal indicators. The apneas/hypopneas are accompanied by minimal oxygen  desaturation (percent time below 90% SpO2: 0%, Min SpO2: 91.8%). The average desaturation across all sleep disordered  breathing events is 1.4%. Snoring occurs for 7.3% (30 dB) of the study. The mean pulse rate is 67.7 BPM, with very frequent  pulse rate variability (93 events with >= 6 BPM increase/decrease per hour).  TREATMENT CONSIDERATIONS: For a patient with mild asymptomatic GIRISH, nasal continuous positive airway pressure  (CPAP/AutoPAP) therapy or alternative therapies for treatment should be considered, i.e., Mandibular advancement splint  (MAS), referral to an ENT surgeon for modification to the airway, and/or weight loss or behavioral therapy to reduce the  potential risk of daytime somnolence, hypertension, cardiovascular disease, stroke and diabetes.  DISEASE MANAGEMENT CONSIDERATIONS: None.    Dear Lissa Lomeli MD  [address]  [address]  [place] [address]/Lissa Lomeli MD        "  The sleep study that you ordered is complete.  You have ordered sleep LAB services to perform the sleep study for Emre Blanchard .      Please find Sleep Study result in  the "Media tab" of Chart Review menu.        You can look  for the report in the  Media by the document type "Sleep Study Documents". Alphabetizing  "Document type" column helps to find the SLEEP STUDY report  Faster.       As the ordering provider, you are responsible for reviewing the results and implementing a treatment plan with your patient.    If you need a Sleep Medicine provider to explain the sleep study findings and arrange treatment for the patient, please refer patient for consultation to our Sleep Clinic via [facility] with Ambulatory Consult Sleep.     To do that please place an order for an  "Ambulatory Consult Sleep" -  order , it will go to our clinic work queue for our staff  to contact the patient for an appointment.      For any questions, please contact our sleep lab  staff at [phone] to talk to clinical staff          Markus Garvey MD   "

## 2023-03-16 DIAGNOSIS — G47.33 OSA (OBSTRUCTIVE SLEEP APNEA): Primary | ICD-10-CM

## 2024-04-04 DIAGNOSIS — I10 ESSENTIAL HYPERTENSION: ICD-10-CM

## 2024-04-04 NOTE — TELEPHONE ENCOUNTER
Care Due:                  Date            Visit Type   Department     Provider  --------------------------------------------------------------------------------                                NP -                              PRIMARY      [facility] PRIMARY  Last Visit: 02-      CARE ([facility])   CARE           Lissa Lomeli  Next Visit: None Scheduled  None         None Found                                                            Last  Test          Frequency    Reason                     Performed    Due Date  --------------------------------------------------------------------------------    Office Visit  15 months..  amlodipine-benazepril....  02-   05-    CMP.........  12 months..  amlodipine-benazepril....  02- 02-    [facility] Embedded Care Due Messages. Reference number: [id number].   4/04/2024 4:15:56 AM CDT

## 2024-04-05 RX ORDER — AMLODIPINE AND BENAZEPRIL HYDROCHLORIDE 5; 20 MG/1; MG/1
1 CAPSULE ORAL DAILY
Qty: 15 CAPSULE | Refills: 0 | Status: SHIPPED | OUTPATIENT
Start: 2024-04-05 | End: 2024-05-02 | Stop reason: CLARIF

## 2024-04-05 NOTE — TELEPHONE ENCOUNTER
Refill Routing Note   Medication(s) are not appropriate for processing by [facility] for the following reason(s):        Required labs outdated  Required vitals outdated    [facility] action(s):  Defer   Requires appointment : Yes     Requires labs : Yes             Appointments  past 12m or future 3m with PCP    Date Provider   Last Visit   2/20/2023 Lissa Lomeli MD   Next Visit   Visit date not found Lissa Lomeli MD   ED visits in past 90 days: 0        Note composed:10:33 AM 04/05/2024

## 2024-04-09 ENCOUNTER — PATIENT MESSAGE (OUTPATIENT)
Dept: ADMINISTRATIVE | Facility: OTHER | Age: 29
End: 2024-04-09
Payer: MEDICAID

## 2024-04-12 ENCOUNTER — PATIENT MESSAGE (OUTPATIENT)
Dept: ADMINISTRATIVE | Facility: HOSPITAL | Age: 29
End: 2024-04-12
Payer: MEDICAID

## 2024-05-02 ENCOUNTER — OFFICE VISIT (OUTPATIENT)
Dept: PRIMARY CARE CLINIC | Facility: CLINIC | Age: 29
End: 2024-05-02
Payer: MEDICAID

## 2024-05-02 VITALS
HEIGHT: 71 IN | HEART RATE: 104 BPM | OXYGEN SATURATION: 99 % | TEMPERATURE: 98 F | BODY MASS INDEX: 44.1 KG/M2 | WEIGHT: 315 LBS | DIASTOLIC BLOOD PRESSURE: 98 MMHG | SYSTOLIC BLOOD PRESSURE: 132 MMHG

## 2024-05-02 DIAGNOSIS — E66.01 CLASS 3 SEVERE OBESITY WITH SERIOUS COMORBIDITY AND BODY MASS INDEX (BMI) OF 45.0 TO 49.9 IN ADULT, UNSPECIFIED OBESITY TYPE: ICD-10-CM

## 2024-05-02 DIAGNOSIS — Z11.3 SCREEN FOR STD (SEXUALLY TRANSMITTED DISEASE): ICD-10-CM

## 2024-05-02 DIAGNOSIS — Z13.1 ENCOUNTER FOR SCREENING FOR DIABETES MELLITUS: ICD-10-CM

## 2024-05-02 DIAGNOSIS — Z00.01 ENCOUNTER FOR GENERAL ADULT MEDICAL EXAMINATION WITH ABNORMAL FINDINGS: Primary | ICD-10-CM

## 2024-05-02 DIAGNOSIS — Z11.4 ENCOUNTER FOR SCREENING FOR HIV: ICD-10-CM

## 2024-05-02 DIAGNOSIS — F41.1 GAD (GENERALIZED ANXIETY DISORDER): Chronic | ICD-10-CM

## 2024-05-02 DIAGNOSIS — I10 ESSENTIAL HYPERTENSION: ICD-10-CM

## 2024-05-02 PROCEDURE — 99999 PR PBB SHADOW E&M-EST. PATIENT-LVL IV: CPT | Mod: PBBFAC,,, | Performed by: FAMILY MEDICINE

## 2024-05-02 PROCEDURE — 3080F DIAST BP >= 90 MM HG: CPT | Mod: CPTII,,, | Performed by: FAMILY MEDICINE

## 2024-05-02 PROCEDURE — 1159F MED LIST DOCD IN RCRD: CPT | Mod: CPTII,,, | Performed by: FAMILY MEDICINE

## 2024-05-02 PROCEDURE — 99395 PREV VISIT EST AGE 18-39: CPT | Mod: S$PBB,,, | Performed by: FAMILY MEDICINE

## 2024-05-02 PROCEDURE — 99214 OFFICE O/P EST MOD 30 MIN: CPT | Mod: PBBFAC,PN | Performed by: FAMILY MEDICINE

## 2024-05-02 PROCEDURE — 3008F BODY MASS INDEX DOCD: CPT | Mod: CPTII,,, | Performed by: FAMILY MEDICINE

## 2024-05-02 PROCEDURE — 1160F RVW MEDS BY RX/DR IN RCRD: CPT | Mod: CPTII,,, | Performed by: FAMILY MEDICINE

## 2024-05-02 PROCEDURE — 99213 OFFICE O/P EST LOW 20 MIN: CPT | Mod: S$PBB,25,, | Performed by: FAMILY MEDICINE

## 2024-05-02 PROCEDURE — 3075F SYST BP GE 130 - 139MM HG: CPT | Mod: CPTII,,, | Performed by: FAMILY MEDICINE

## 2024-05-02 PROCEDURE — 4010F ACE/ARB THERAPY RXD/TAKEN: CPT | Mod: CPTII,,, | Performed by: FAMILY MEDICINE

## 2024-05-02 RX ORDER — AMLODIPINE AND OLMESARTAN MEDOXOMIL 5; 40 MG/1; MG/1
1 TABLET ORAL DAILY
Qty: 90 TABLET | Refills: 3 | Status: SHIPPED | OUTPATIENT
Start: 2024-05-02 | End: 2025-05-02

## 2024-05-02 RX ORDER — HYDROXYZINE PAMOATE 25 MG/1
25 CAPSULE ORAL EVERY 6 HOURS PRN
Qty: 30 CAPSULE | Refills: 11 | Status: SHIPPED | OUTPATIENT
Start: 2024-05-02 | End: 2024-05-02 | Stop reason: SDUPTHER

## 2024-05-02 RX ORDER — HYDROXYZINE PAMOATE 25 MG/1
25 CAPSULE ORAL EVERY 8 HOURS PRN
Qty: 30 CAPSULE | Refills: 5 | Status: SHIPPED | OUTPATIENT
Start: 2024-05-02

## 2024-05-02 NOTE — PROGRESS NOTES
Subjective:       Patient ID: Emre Blanchard is a 28 y.o. male.    Chief Complaint: Hypertension (Med refills (out of BP med for 1 week) Requesting labs (CMP)) and Annual Exam      History of Present Illness:   Emre Blanchard 28 y.o. male presents today with Hypertension (Med refills (out of BP med for 1 week) Requesting labs (CMP)) and Annual Exam  Well Adult Physical: Patient here for a comprehensive physical exam.The patient reports problems - elevated blood pressure.    Emre Blanchard's allergies, medications, history, and problem list were updated as appropriate.   Past Medical History:   Diagnosis Date    Hypertension      No family history on file.  Social History     Socioeconomic History    Marital status: Single   Tobacco Use    Smoking status: Never     Passive exposure: Never    Smokeless tobacco: Never   Substance and Sexual Activity    Alcohol use: Yes     Comment: occassion    Drug use: Not Currently    Sexual activity: Not Currently     Partners: Female     Social Determinants of Health     Financial Resource Strain: Low Risk  (2/20/2023)    Overall Financial Resource Strain (CARDIA)     Difficulty of Paying Living Expenses: Not hard at all   Food Insecurity: No Food Insecurity (2/20/2023)    Hunger Vital Sign     Worried About Running Out of Food in the Last Year: Never true     Ran Out of Food in the Last Year: Never true   Transportation Needs: No Transportation Needs (2/20/2023)    PRAPARE - Transportation     Lack of Transportation (Medical): No     Lack of Transportation (Non-Medical): No   Physical Activity: Inactive (2/20/2023)    Exercise Vital Sign     Days of Exercise per Week: 0 days     Minutes of Exercise per Session: 0 min   Stress: No Stress Concern Present (2/20/2023)    [employer] [place] [employer] - Occupational Stress Questionnaire     Feeling of Stress : Only a little   Housing Stability: Low Risk  (2/20/2023)    Housing Stability Vital Sign     Unable to Pay for Housing  "in the Last Year: No     Number of Places Lived in the Last Year: 1     Unstable Housing in the Last Year: No     Outpatient Encounter Medications as of 5/2/2024   Medication Sig Dispense Refill    [DISCONTINUED] amlodipine-benazepril 5-20 mg (LOTREL) 5-20 mg per capsule Take 1 capsule by mouth once daily. for 15 days 15 capsule 0    [DISCONTINUED] hydrOXYzine pamoate (VISTARIL) 25 MG Cap Take 1 capsule (25 mg total) by mouth every 6 (six) hours as needed (anxiousness). 30 capsule 0    amlodipine-olmesartan (ROVERTO) 5-40 mg per tablet Take 1 tablet by mouth once daily. 90 tablet 3    hydrOXYzine pamoate (VISTARIL) 25 MG Cap Take 1 capsule (25 mg total) by mouth every 8 (eight) hours as needed (anxiousness). 30 capsule 5    [DISCONTINUED] hydrOXYzine pamoate (VISTARIL) 25 MG Cap Take 1 capsule (25 mg total) by mouth every 6 (six) hours as needed (anxiousness). 30 capsule 11     No facility-administered encounter medications on file as of 5/2/2024.       Review of Systems   Constitutional:  Negative for activity change, fatigue and fever.   Cardiovascular:  Negative for chest pain, palpitations and leg swelling.   Neurological:  Negative for weakness and light-headedness.       Objective:      BP (!) 132/98 (BP Location: Right arm, Patient Position: Sitting, BP Method: Large (Manual))   Pulse 104   Temp 97.9 °F (36.6 °C)   Ht 5' 11" (1.803 m)   Wt (!) 150.5 kg (331 lb 12.8 oz)   SpO2 99%   BMI 46.28 kg/m²   Physical Exam  Vitals and nursing note reviewed.   Constitutional:       General: He is not in acute distress.     Appearance: He is well-developed.   HENT:      Head: Normocephalic and atraumatic.      Right Ear: External ear normal.      Left Ear: External ear normal.   Eyes:      Conjunctiva/sclera: Conjunctivae normal.   Neck:      Thyroid: No thyromegaly.   Cardiovascular:      Rate and Rhythm: Normal rate and regular rhythm.      Pulses: Normal pulses.      Heart sounds: Normal heart sounds. No murmur " heard.  Pulmonary:      Effort: Pulmonary effort is normal. No respiratory distress.      Breath sounds: Normal breath sounds.   Genitourinary:     Comments: deferred  Musculoskeletal:         General: No deformity.      Cervical back: Neck supple.   Lymphadenopathy:      Head:      Right side of head: No submandibular adenopathy.      Left side of head: No submandibular adenopathy.      Cervical: No cervical adenopathy.   Skin:     General: Skin is warm and dry.   Neurological:      Mental Status: He is alert and oriented to person, place, and time.   Psychiatric:         Behavior: Behavior normal.         Results for orders placed or performed in visit on 02/20/23   C. trachomatis/N. gonorrhoeae by AMP DNA   Result Value Ref Range    Chlamydia, Amplified DNA Not Detected Not Detected    N gonorrhoeae, amplified DNA Not Detected Not Detected     Assessment:       1. Encounter for general adult medical examination with abnormal findings    2. Class 3 severe obesity with serious comorbidity and body mass index (BMI) of 45.0 to 49.9 in adult, unspecified obesity type    3. BMI 45.0-49.9, adult    4. Essential hypertension    5. Encounter for screening for HIV    6. Encounter for screening for diabetes mellitus    7. Screen for STD (sexually transmitted disease)    8. LOREN (generalized anxiety disorder)        Plan:   1. Encounter for general adult medical examination with abnormal findings  Comments:  elevated blood pressure    2. Class 3 severe obesity with serious comorbidity and body mass index (BMI) of 45.0 to 49.9 in adult, unspecified obesity type  Overview:  Maintain/Continue a heathy lifestyle.  Choose a diet rich in fruits, vegetables, and low-fat dairy products, but low in meats, sweets, and refined grains   Be more active. If you are able, walk for 35 mins 5 times a week.        Orders:  -     TSH; Future; Expected date: 05/02/2024    3. BMI 45.0-49.9, adult  -     TSH; Future; Expected date:  05/02/2024    4. Essential hypertension  Overview:    HTN Follow up: diagnosed 1-2 years ago. Chronic, Not controlled on  amlodipine-benazepril at home. Was wondering if he needs increased dose.  CV risk-obesity-see advise below.  Will switch out of the acei and replace with an arbs d/t risk for angioedema.  Low salt diet recommended     Orders:  -     Lipid Panel; Future; Expected date: 05/02/2024  -     Comprehensive Metabolic Panel; Future; Expected date: 05/02/2024  -     CBC Auto Differential; Future; Expected date: 05/02/2024  -     Microalbumin/Creatinine Ratio, Urine; Future; Expected date: 05/02/2024  -     amlodipine-olmesartan (ROVERTO) 5-40 mg per tablet; Take 1 tablet by mouth once daily.  Dispense: 90 tablet; Refill: 3    5. Encounter for screening for HIV  -     HIV 1/2 Ag/Ab (4th Gen); Future; Expected date: 05/02/2024    6. Encounter for screening for diabetes mellitus  -     Hemoglobin A1C; Future; Expected date: 05/02/2024    7. Screen for STD (sexually transmitted disease)  -     C. trachomatis/N. gonorrhoeae by AMP DNA; Future; Expected date: 05/02/2024  -     Hepatitis C Antibody; Future; Expected date: 05/02/2024  -     Hepatitis B Surface Antigen; Future; Expected date: 05/02/2024  -     Treponema Pallidium Antibodies IgG, IgM; Future; Expected date: 05/02/2024    8. LOREN (generalized anxiety disorder)  Comments:  controlled, takes hydroxyzine at night and it helps with insomnia  Orders:  -     Discontinue: hydrOXYzine pamoate (VISTARIL) 25 MG Cap; Take 1 capsule (25 mg total) by mouth every 6 (six) hours as needed (anxiousness).  Dispense: 30 capsule; Refill: 11  -     hydrOXYzine pamoate (VISTARIL) 25 MG Cap; Take 1 capsule (25 mg total) by mouth every 8 (eight) hours as needed (anxiousness).  Dispense: 30 capsule; Refill: 5        I have reviewed all of the patient's clinical history available in care everywhere and Epic and have utilized this in my evaluation and management recommendations  today.      Treatment options and alternatives were discussed with the patient. Patient was given ample time to ask questions. All questions were answered. Voices understanding and acceptance of this advice. Will call back if any further questions or concerns.     Portions of the record may have been created with voice recognition software. Occasional wrong-word or sound-a-like substitutions may have occurred due to the inherent limitations of voice recognition software. Read the chart carefully and recognize, using context, where substitutions have occurred.               Lissa Lomeli MD  [facility],

## 2024-05-16 ENCOUNTER — TELEPHONE (OUTPATIENT)
Dept: PRIMARY CARE CLINIC | Facility: CLINIC | Age: 29
End: 2024-05-16
Payer: MEDICAID

## 2024-05-16 ENCOUNTER — PATIENT MESSAGE (OUTPATIENT)
Dept: PRIMARY CARE CLINIC | Facility: CLINIC | Age: 29
End: 2024-05-16
Payer: MEDICAID

## 2024-05-16 VITALS — SYSTOLIC BLOOD PRESSURE: 117 MMHG | DIASTOLIC BLOOD PRESSURE: 74 MMHG

## 2024-05-16 NOTE — PROGRESS NOTES
Patient has been monitoring blood pressure at home. Readings are recorded and scanned into the patient chart for documentation.

## 2024-05-27 ENCOUNTER — PATIENT MESSAGE (OUTPATIENT)
Dept: PRIMARY CARE CLINIC | Facility: CLINIC | Age: 29
End: 2024-05-27
Payer: MEDICAID

## 2024-06-06 ENCOUNTER — TELEPHONE (OUTPATIENT)
Dept: PRIMARY CARE CLINIC | Facility: CLINIC | Age: 29
End: 2024-06-06

## 2024-06-06 NOTE — TELEPHONE ENCOUNTER
----- Message from Sylwia Hernandez sent at 6/6/2024  3:32 PM CDT -----  Contact: [phone]  Patient is returning a phone call.  Who left a message for the patient: ???  Does patient know what this is regarding:    Would you like a call back, or a response through your [facility] portal?:   call  Comments: Please call patient back

## 2024-06-14 ENCOUNTER — OFFICE VISIT (OUTPATIENT)
Dept: PRIMARY CARE CLINIC | Facility: CLINIC | Age: 29
End: 2024-06-14
Payer: MEDICAID

## 2024-06-14 DIAGNOSIS — I10 ESSENTIAL HYPERTENSION: Chronic | ICD-10-CM

## 2024-06-14 DIAGNOSIS — G47.33 OSA (OBSTRUCTIVE SLEEP APNEA): Chronic | ICD-10-CM

## 2024-06-14 DIAGNOSIS — E66.01 CLASS 3 SEVERE OBESITY WITH SERIOUS COMORBIDITY AND BODY MASS INDEX (BMI) OF 40.0 TO 44.9 IN ADULT, UNSPECIFIED OBESITY TYPE: ICD-10-CM

## 2024-06-14 DIAGNOSIS — R73.03 PREDIABETES: Chronic | ICD-10-CM

## 2024-06-14 DIAGNOSIS — E78.2 MIXED HYPERLIPIDEMIA: Chronic | ICD-10-CM

## 2024-06-14 PROCEDURE — 99214 OFFICE O/P EST MOD 30 MIN: CPT | Mod: 95,,, | Performed by: FAMILY MEDICINE

## 2024-06-14 PROCEDURE — 3061F NEG MICROALBUMINURIA REV: CPT | Mod: CPTII,95,, | Performed by: FAMILY MEDICINE

## 2024-06-14 PROCEDURE — 3044F HG A1C LEVEL LT 7.0%: CPT | Mod: CPTII,95,, | Performed by: FAMILY MEDICINE

## 2024-06-14 PROCEDURE — 1160F RVW MEDS BY RX/DR IN RCRD: CPT | Mod: CPTII,95,, | Performed by: FAMILY MEDICINE

## 2024-06-14 PROCEDURE — 1159F MED LIST DOCD IN RCRD: CPT | Mod: CPTII,95,, | Performed by: FAMILY MEDICINE

## 2024-06-14 PROCEDURE — 4010F ACE/ARB THERAPY RXD/TAKEN: CPT | Mod: CPTII,95,, | Performed by: FAMILY MEDICINE

## 2024-06-14 PROCEDURE — 3066F NEPHROPATHY DOC TX: CPT | Mod: CPTII,95,, | Performed by: FAMILY MEDICINE

## 2024-06-14 NOTE — PROGRESS NOTES
The patient location is: [place] at home  Visit type: Virtual visit with synchronous audio and video  Total time spent with patient:30 mins  This includes face to face time and non-face to face time preparing to see the patient (eg, review of tests), obtaining and/or reviewing separately obtained history, documenting clinical information in the electronic or other health record, independently interpreting results and communicating results to the patient/family/caregiver, or care coordinator.   Each patient to whom he or she provides medical services by telemedicine is:  (1) informed of the relationship between the physician and patient and the respective role of any other health care provider with respect to management of the patient; and (2) notified that he or she may decline to receive medical services by telemedicine and may withdraw from such care at any time.    Subjective:       Patient ID: Emre Blanchard is a 28 y.o. male.    Chief Complaint: Obesity      History of Present Illness:   Emre Blanchard 28 y.o. male presents today with Obesity    Emre Blanchard's allergies, medications, history, and problem list were updated as appropriate.   Past Medical History:   Diagnosis Date    Hypertension      No family history on file.  Social History     Socioeconomic History    Marital status: Single   Tobacco Use    Smoking status: Never     Passive exposure: Never    Smokeless tobacco: Never   Substance and Sexual Activity    Alcohol use: Yes     Comment: occassion    Drug use: Not Currently    Sexual activity: Not Currently     Partners: Female     Social Determinants of Health     Financial Resource Strain: Patient Declined (6/13/2024)    Overall Financial Resource Strain (CARDIA)     Difficulty of Paying Living Expenses: Patient declined   Food Insecurity: Food Insecurity Present (6/13/2024)    Hunger Vital Sign     Worried About Running Out of Food in the Last Year: Sometimes true     Ran Out of Food in the Last  Year: Sometimes true   Transportation Needs: No Transportation Needs (2/20/2023)    PRAPARE - Transportation     Lack of Transportation (Medical): No     Lack of Transportation (Non-Medical): No   Physical Activity: Insufficiently Active (6/13/2024)    Exercise Vital Sign     Days of Exercise per Week: 1 day     Minutes of Exercise per Session: 30 min   Stress: No Stress Concern Present (6/13/2024)    [employer] [place] [employer] - Occupational Stress Questionnaire     Feeling of Stress : Only a little   Housing Stability: Unknown (6/13/2024)    Housing Stability Vital Sign     Unable to Pay for Housing in the Last Year: Patient declined     Outpatient Encounter Medications as of 6/14/2024   Medication Sig Dispense Refill    amlodipine-olmesartan (ROVERTO) 5-40 mg per tablet Take 1 tablet by mouth once daily. 90 tablet 3    hydrOXYzine pamoate (VISTARIL) 25 MG Cap Take 1 capsule (25 mg total) by mouth every 8 (eight) hours as needed (anxiousness). 30 capsule 5     No facility-administered encounter medications on file as of 6/14/2024.       Review of Systems   Constitutional:  Negative for activity change and unexpected weight change.   HENT:  Negative for hearing loss, rhinorrhea and trouble swallowing.    Eyes:  Negative for discharge and visual disturbance.   Respiratory:  Negative for chest tightness and wheezing.    Cardiovascular:  Negative for chest pain and palpitations.   Gastrointestinal:  Negative for blood in stool, constipation, diarrhea and vomiting.   Endocrine: Negative for polydipsia and polyuria.   Genitourinary:  Negative for difficulty urinating, hematuria and urgency.   Musculoskeletal:  Negative for arthralgias, joint swelling and neck pain.   Neurological:  Negative for weakness and headaches.   Psychiatric/Behavioral:  Negative for confusion and dysphoric mood.        Objective:      There were no vitals taken for this visit.  Physical Exam    CONSTITUTIONAL: No apparent distress.  Appears comfortable. Does not appear acutely ill or septic.   CARDIOVASCULAR: No perioral cyanosis  PULMONARY: Breathing unlabored. No retractions Chest expansion grossly normal.  PSYCHIATRIC: Alert and conversant and grossly oriented. Mood is grossly neutral. Affect appropriate. Judgment and insight grossly intact.  NEUROLOGIC: No focal sensory deficits reported.   Results for orders placed or performed in visit on 05/02/24   Lipid Panel   Result Value Ref Range    Cholesterol 216 (H) 120 - 199 mg/dL    Triglycerides 235 (H) 30 - 150 mg/dL    HDL 49 40 - 75 mg/dL    LDL Cholesterol 120.0 63.0 - 159.0 mg/dL    HDL/Cholesterol Ratio 22.7 20.0 - 50.0 %    Total Cholesterol/HDL Ratio 4.4 2.0 - 5.0    Non-HDL Cholesterol 167 mg/dL   HIV 1/2 Ag/Ab (4th Gen)   Result Value Ref Range    HIV 1/2 Ag/Ab Non-reactive Non-reactive   Comprehensive Metabolic Panel   Result Value Ref Range    Sodium 138 136 - 145 mmol/L    Potassium 3.9 3.5 - 5.1 mmol/L    Chloride 103 95 - 110 mmol/L    CO2 21 (L) 23 - 29 mmol/L    Glucose 114 (H) 70 - 110 mg/dL    BUN 8 6 - 20 mg/dL    Creatinine 0.9 0.5 - 1.4 mg/dL    Calcium 9.8 8.7 - 10.5 mg/dL    Total Protein 7.9 6.0 - 8.4 g/dL    Albumin 4.1 3.5 - 5.2 g/dL    Total Bilirubin 0.5 0.1 - 1.0 mg/dL    Alkaline Phosphatase 79 55 - 135 U/L    AST 19 10 - 40 U/L    ALT 23 10 - 44 U/L    eGFR >60.0 >60 mL/min/1.73 m^2    Anion Gap 14 8 - 16 mmol/L   CBC Auto Differential   Result Value Ref Range    WBC 9.15 3.90 - 12.70 K/uL    RBC 6.39 (H) 4.60 - 6.20 M/uL    Hemoglobin 17.5 14.0 - 18.0 g/dL    Hematocrit 54.5 (H) 40.0 - 54.0 %    MCV 85 82 - 98 fL    MCH 27.4 27.0 - 31.0 pg    MCHC 32.1 32.0 - 36.0 g/dL    RDW 13.6 11.5 - 14.5 %    Platelets 357 150 - 450 K/uL    MPV 10.2 9.2 - 12.9 fL    Immature Granulocytes 0.2 0.0 - 0.5 %    Gran # (ANC) 5.3 1.8 - 7.7 K/uL    Immature Grans (Abs) 0.02 0.00 - 0.04 K/uL    Lymph # 3.0 1.0 - 4.8 K/uL    Mono # 0.5 0.3 - 1.0 K/uL    Eos # 0.2 0.0 - 0.5 K/uL     Baso # 0.06 0.00 - 0.20 K/uL    nRBC 0 0 /100 WBC    Gran % 58.4 38.0 - 73.0 %    Lymph % 33.2 18.0 - 48.0 %    Mono % 5.9 4.0 - 15.0 %    Eosinophil % 1.6 0.0 - 8.0 %    Basophil % 0.7 0.0 - 1.9 %    Differential Method Automated    Hemoglobin A1C   Result Value Ref Range    Hemoglobin A1C 5.9 (H) 4.0 - 5.6 %    Estimated Avg Glucose 123 68 - 131 mg/dL   TSH   Result Value Ref Range    TSH 1.251 0.400 - 4.000 uIU/mL   Hepatitis C Antibody   Result Value Ref Range    Hepatitis C Ab Non-reactive Non-reactive   Hepatitis B Surface Antigen   Result Value Ref Range    Hepatitis B Surface Ag Non-reactive Non-reactive   Treponema Pallidium Antibodies IgG, IgM   Result Value Ref Range    Treponema Pallidum Antibodies (IgG, IgM) Nonreactive Nonreactive     Assessment:       1. BMI 40.0-44.9, adult    2. Class 3 severe obesity with serious comorbidity and body mass index (BMI) of 40.0 to 44.9 in adult, unspecified obesity type    3. Mixed hyperlipidemia    4. Essential hypertension    5. Prediabetes    6. GIRISH (obstructive sleep apnea)        Plan:   1. BMI 40.0-44.9, adult  Comments:  BMI 44.7.  He has lost 10 lb since his last visit.  Interested in bariatric surgery, asking for referral.    2. Class 3 severe obesity with serious comorbidity and body mass index (BMI) of 40.0 to 44.9 in adult, unspecified obesity type  Comments:  Comorbidities include Hypertension, hld, obstructive sleep apnea and prediabetes.  continue lifestyle modification with calorie counting and increase activity.    3. Mixed hyperlipidemia    4. Essential hypertension  Overview:    HTN Follow up: diagnosed 1-2 years ago. Chronic, Not controlled on  amlodipine-benazepril at home. Was wondering if he needs increased dose.  CV risk-obesity-see advise below.  Will switch out of the acei and replace with an arbs d/t risk for angioedema.  Low salt diet recommended       5. Prediabetes    6. GIRISH (obstructive sleep apnea)        Dr Enrike Gee  Address:  [address], [place] [address]  Hours: Open ? Closes 6?PM  Phone: [phone]    2. Why weight? [facility] [place].  [phone]    [facility] center for weight loss and bariatric surgery  or fax    I have reviewed all of the patient's clinical history available in care everywhere and Epic and have utilized this in my evaluation and management recommendations today.      Treatment options and alternatives were discussed with the patient. Patient was given ample time to ask questions. All questions were answered. Voices understanding and acceptance of this advice. Will call back if any further questions or concerns.       Portions of the record may have been created with voice recognition software. Occasional wrong-word or sound-a-like substitutions may have occurred due to the inherent limitations of voice recognition software. Read the chart carefully and recognize, using context, where substitutions have occurred.               Lissa Lomeli MD  [facility],

## 2024-06-20 ENCOUNTER — PATIENT MESSAGE (OUTPATIENT)
Dept: PRIMARY CARE CLINIC | Facility: CLINIC | Age: 29
End: 2024-06-20
Payer: MEDICAID

## 2024-07-09 ENCOUNTER — PATIENT MESSAGE (OUTPATIENT)
Dept: ADMINISTRATIVE | Facility: HOSPITAL | Age: 29
End: 2024-07-09
Payer: MEDICAID

## 2024-07-09 ENCOUNTER — PATIENT MESSAGE (OUTPATIENT)
Dept: RESEARCH | Facility: HOSPITAL | Age: 29
End: 2024-07-09
Payer: MEDICAID

## 2024-07-23 ENCOUNTER — PATIENT MESSAGE (OUTPATIENT)
Dept: PRIMARY CARE CLINIC | Facility: CLINIC | Age: 29
End: 2024-07-23
Payer: MEDICAID

## 2024-10-27 DIAGNOSIS — I10 ESSENTIAL HYPERTENSION: ICD-10-CM

## 2024-10-28 RX ORDER — AMLODIPINE AND OLMESARTAN MEDOXOMIL 5; 40 MG/1; MG/1
1 TABLET ORAL DAILY
Qty: 90 TABLET | Refills: 1 | Status: SHIPPED | OUTPATIENT
Start: 2024-10-28

## 2024-11-05 ENCOUNTER — PATIENT MESSAGE (OUTPATIENT)
Dept: RESEARCH | Facility: HOSPITAL | Age: 29
End: 2024-11-05
Payer: MEDICAID

## 2025-04-13 DIAGNOSIS — I10 ESSENTIAL HYPERTENSION: ICD-10-CM

## 2025-04-13 RX ORDER — AMLODIPINE BESYLATE AND OLMESARTAN MEDOXOMIL 5; 40 MG/1; MG/1
1 TABLET ORAL DAILY
Qty: 30 TABLET | Refills: 0 | Status: SHIPPED | OUTPATIENT
Start: 2025-04-13

## 2025-05-14 DIAGNOSIS — I10 ESSENTIAL HYPERTENSION: ICD-10-CM

## 2025-05-14 NOTE — TELEPHONE ENCOUNTER
No care due was identified.  [facility] Embedded Care Due Messages. Reference number: [id number].   5/14/2025 11:24:52 AM CDT

## 2025-05-15 RX ORDER — AMLODIPINE BESYLATE AND OLMESARTAN MEDOXOMIL 5; 40 MG/1; MG/1
1 TABLET ORAL DAILY
Qty: 30 TABLET | Refills: 0 | Status: SHIPPED | OUTPATIENT
Start: 2025-05-15

## 2025-06-02 ENCOUNTER — PATIENT MESSAGE (OUTPATIENT)
Dept: ADMINISTRATIVE | Facility: HOSPITAL | Age: 30
End: 2025-06-02
Payer: MEDICAID

## 2025-06-18 DIAGNOSIS — I10 ESSENTIAL HYPERTENSION: ICD-10-CM

## 2025-07-07 ENCOUNTER — PATIENT MESSAGE (OUTPATIENT)
Dept: ADMINISTRATIVE | Facility: HOSPITAL | Age: 30
End: 2025-07-07
Payer: MEDICAID

## 2025-08-29 ENCOUNTER — PATIENT MESSAGE (OUTPATIENT)
Dept: ADMINISTRATIVE | Facility: HOSPITAL | Age: 30
End: 2025-08-29
Payer: MEDICAID